# Patient Record
Sex: FEMALE | Race: BLACK OR AFRICAN AMERICAN | Employment: UNEMPLOYED | URBAN - METROPOLITAN AREA
[De-identification: names, ages, dates, MRNs, and addresses within clinical notes are randomized per-mention and may not be internally consistent; named-entity substitution may affect disease eponyms.]

---

## 2021-08-09 ENCOUNTER — OFFICE VISIT (OUTPATIENT)
Dept: ORTHOPEDIC SURGERY | Age: 71
End: 2021-08-09
Payer: COMMERCIAL

## 2021-08-09 VITALS
WEIGHT: 161.8 LBS | BODY MASS INDEX: 30.55 KG/M2 | TEMPERATURE: 97.2 F | HEART RATE: 45 BPM | HEIGHT: 61 IN | OXYGEN SATURATION: 98 %

## 2021-08-09 DIAGNOSIS — M51.27 PROTRUSION OF INTERVERTEBRAL DISC OF LUMBOSACRAL REGION: ICD-10-CM

## 2021-08-09 DIAGNOSIS — M54.31 RIGHT SIDED SCIATICA: Primary | ICD-10-CM

## 2021-08-09 PROCEDURE — 99204 OFFICE O/P NEW MOD 45 MIN: CPT | Performed by: PHYSICAL MEDICINE & REHABILITATION

## 2021-08-09 RX ORDER — CANDESARTAN 32 MG/1
32 TABLET ORAL DAILY
COMMUNITY
Start: 2020-08-24

## 2021-08-09 RX ORDER — LEVOTHYROXINE SODIUM 75 UG/1
TABLET ORAL
COMMUNITY
Start: 2021-02-23

## 2021-08-09 RX ORDER — BIOTIN 1 MG
1 TABLET ORAL
COMMUNITY
Start: 2021-02-22 | End: 2021-11-29

## 2021-08-09 RX ORDER — UBIDECARENONE 50 MG
1 CAPSULE ORAL DAILY
COMMUNITY

## 2021-08-09 RX ORDER — CIDER VINEGAR 300 MG
TABLET ORAL
COMMUNITY
Start: 2021-02-22 | End: 2022-01-06

## 2021-08-09 RX ORDER — HYDROCHLOROTHIAZIDE 25 MG/1
TABLET ORAL
COMMUNITY
Start: 2021-02-22

## 2021-08-09 RX ORDER — PREGABALIN 50 MG/1
CAPSULE ORAL
Qty: 60 CAPSULE | Refills: 1 | Status: SHIPPED | OUTPATIENT
Start: 2021-08-09 | End: 2021-10-29

## 2021-08-09 NOTE — PROGRESS NOTES
[address], [address]  [place], [address] [place] [address] Street  Phone: [phone]  Fax: [phone]  : 1950  PCP: Tristan Eisenberg MD    NEW PATIENT EVALUATION      ASSESSMENT AND PLAN    Diagnoses and all orders for this visit:    1. Right sided sciatica  -     pregabalin (Lyrica) 50 mg capsule; Take 1 po QHS x 1 week. Then increase to BID    2. Protrusion of intervertebral disc of lumbosacral region, R  -     pregabalin (Lyrica) 50 mg capsule; Take 1 po QHS x 1 week. Then increase to BID         1. Shabana Duenas is a 80 y.o. female P/T  w/radiculitis due to R L5/S1 protrusion. Discussed meds vs. inj.  2. Advised to continue HEP as tolerated. 3. Trial of Lyrica 50 mg. Take 1 po QHS x 1 week then increase to BID  4. Avoid repetitive bending, lifting, and twisting. Follow-up and Dispositions    · Return in about 4 weeks (around 2021). HISTORY OF PRESENT ILLNESS  Shabana Duenas is seen today at the request of Dr. Roxy Bustos in consultation for right sciatic pain. She reports the pain being intermittent since 2021. She mentions that the pain is worse with prolonged standing and sitting. The pain does not cause insomnia. Patient used to be able to walk 2 miles, but her walking tolerance has diminished since the onset of pain. She is taking Aleve, which provides temporary benefit. She mentions taking Prednisone with relief. She completed physical therapy x 3with no benefit. Patient is compliant with HEP as tolerated. Denies red flags including persistent fevers, chills, weight changes, neurogenic bowel or bladder symptoms. No prior hx sciatica, no constitutional symptoms.       Pain Assessment  2021   Location of Pain Leg   Location Modifiers Right   Severity of Pain 6   Quality of Pain Dull   Frequency of Pain Constant   Aggravating Factors Standing;Walking   Aggravating Factors Comment lying on right side   Relieving Factors Rest       Onset of pain: intermittent since 4/2021, no injury    Does patient have numbness/tingling in extremities: no    Denies persistent fevers, chills, weight changes, saddle paresthesias, and neurogenic bowel or bladder symptoms. Investigations:     L MRI 7/2021: right L5-S1 disc protrusion, left psoas fatty infiltration  Spine surgery consult: no    Treatments:  Physical therapy: yes - no benefit  Spinal injections: no  Spinal surgery- no  Beneficial medications: Prednisone, Aleve - temporary benefit  Failed medications: none    Work Status:   Pertinent PMHx:  GERD, Hypothyroidism, HTN. PHYSICAL EXAM      Tender right sciatic notch  SLR positive at 90 degrees on right   Radiating posterior thigh pain with FF  LE strength intact    Visit Vitals  Pulse (!) 45   Temp 97.2 °F (36.2 °C) (Skin)   Ht 5' 1\" (1.549 m)   Wt 161 lb 12.8 oz (73.4 kg)   SpO2 98%   BMI 30.57 kg/m²         Past Medical History:   Diagnosis Date    GERD (gastroesophageal reflux disease)     Hypertension     Thyroid disease         Past Surgical History:   Procedure Laterality Date    HX HYSTERECTOMY               Ms. Sanchez Baez may have a reminder for a \"due or due soon\" health maintenance. I have asked that she contact her primary care provider for follow-up on this health maintenance. This note was created using Dragon transcription software and may contain unintended errors.

## 2021-08-09 NOTE — LETTER
8/9/2021    Patient: Zoran Yi   YOB: 1950   Date of Visit: 8/9/2021     Rhonda Collins, [facility]  [address] [address] [address]  Via Fax: [phone]     Christin Hitchcock MD  [address] [facility] [address] [facility] [address] [address]  Via Fax: [phone]    Dear MD Christin Chavez MD,      Thank you for referring Ms. Margarett Duverney to [address] [place] [address] for evaluation. My notes for this consultation are attached. If you have questions, please do not hesitate to call me. I look forward to following your patient along with you.       Sincerely,    Castillo Mejia MD

## 2021-10-19 ENCOUNTER — TELEPHONE (OUTPATIENT)
Dept: ORTHOPEDIC SURGERY | Age: 71
End: 2021-10-19

## 2021-10-19 DIAGNOSIS — M54.31 RIGHT SIDED SCIATICA: Primary | ICD-10-CM

## 2021-10-19 NOTE — TELEPHONE ENCOUNTER
Patients  called on her behalf to request an urgent appointment. He says the last appt was canceled due to an emergency and she cannot wait for next available appointment on 11/24. She prefers to see her physician and doesn't want to wait for cancellations. He says her right sided pain is getting so bad she can hardly walk. Can she be worked in for an emergency appointment? Please contact patient to advise or further discuss, [phone].

## 2021-10-20 ENCOUNTER — OFFICE VISIT (OUTPATIENT)
Dept: ORTHOPEDIC SURGERY | Age: 71
End: 2021-10-20
Payer: COMMERCIAL

## 2021-10-20 VITALS
TEMPERATURE: 96.8 F | OXYGEN SATURATION: 98 % | HEIGHT: 61 IN | WEIGHT: 158 LBS | HEART RATE: 63 BPM | DIASTOLIC BLOOD PRESSURE: 75 MMHG | SYSTOLIC BLOOD PRESSURE: 157 MMHG | BODY MASS INDEX: 29.83 KG/M2

## 2021-10-20 DIAGNOSIS — M54.16 RIGHT LUMBAR RADICULITIS: Primary | ICD-10-CM

## 2021-10-20 DIAGNOSIS — M51.27 PROTRUSION OF INTERVERTEBRAL DISC OF LUMBOSACRAL REGION: ICD-10-CM

## 2021-10-20 PROCEDURE — 20552 NJX 1/MLT TRIGGER POINT 1/2: CPT | Performed by: PHYSICAL MEDICINE & REHABILITATION

## 2021-10-20 PROCEDURE — 99214 OFFICE O/P EST MOD 30 MIN: CPT | Performed by: PHYSICAL MEDICINE & REHABILITATION

## 2021-10-20 RX ORDER — BUPIVACAINE HYDROCHLORIDE 2.5 MG/ML
0.5 INJECTION, SOLUTION INFILTRATION; PERINEURAL ONCE
Status: COMPLETED | OUTPATIENT
Start: 2021-10-20 | End: 2021-10-20

## 2021-10-20 RX ORDER — DULOXETIN HYDROCHLORIDE 30 MG/1
30 CAPSULE, DELAYED RELEASE ORAL
Qty: 30 CAPSULE | Refills: 2 | Status: SHIPPED | OUTPATIENT
Start: 2021-10-20 | End: 2021-11-29

## 2021-10-20 RX ORDER — OMEPRAZOLE 20 MG/1
CAPSULE, DELAYED RELEASE ORAL
COMMUNITY
Start: 2021-08-23

## 2021-10-20 RX ORDER — BETAMETHASONE SODIUM PHOSPHATE AND BETAMETHASONE ACETATE 3; 3 MG/ML; MG/ML
12 INJECTION, SUSPENSION INTRA-ARTICULAR; INTRALESIONAL; INTRAMUSCULAR; SOFT TISSUE ONCE
Status: COMPLETED | OUTPATIENT
Start: 2021-10-20 | End: 2021-10-20

## 2021-10-20 RX ORDER — LIDOCAINE HYDROCHLORIDE 20 MG/ML
0.5 INJECTION, SOLUTION INFILTRATION; PERINEURAL ONCE
Status: COMPLETED | OUTPATIENT
Start: 2021-10-20 | End: 2021-10-20

## 2021-10-20 RX ADMIN — BUPIVACAINE HYDROCHLORIDE 1.25 MG: 2.5 INJECTION, SOLUTION INFILTRATION; PERINEURAL at 12:13

## 2021-10-20 RX ADMIN — BETAMETHASONE SODIUM PHOSPHATE AND BETAMETHASONE ACETATE 12 MG: 3; 3 INJECTION, SUSPENSION INTRA-ARTICULAR; INTRALESIONAL; INTRAMUSCULAR; SOFT TISSUE at 12:13

## 2021-10-20 RX ADMIN — LIDOCAINE HYDROCHLORIDE 10 MG: 20 INJECTION, SOLUTION INFILTRATION; PERINEURAL at 12:14

## 2021-10-20 NOTE — LETTER
10/20/2021    Patient: Joselyn Sandoval   YOB: 1950   Date of Visit: 10/20/2021     Maxi Atkins MD  [address] [facility]  Via Fax: [phone]    Dear Maxi Atkins MD,      Thank you for referring Ms. Vanessa Rushing to [facility] for evaluation. My notes for this consultation are attached. If you have questions, please do not hesitate to call me. I look forward to following your patient along with you.       Sincerely,    Junito Horner MD

## 2021-10-20 NOTE — PROGRESS NOTES
[address]  [address], [address]  [place], [address] [place] [address] Street  Phone: [phone]  Fax: [phone]        Lizzie Ramos  : 1950  PCP: Karine Callejas MD    PROGRESS NOTE      ASSESSMENT AND PLAN    Diagnoses and all orders for this visit:    1. Right lumbar radiculitis  -     DULoxetine (CYMBALTA) 30 mg capsule; Take 1 Capsule by mouth daily (with dinner). -     INJECT TRIGGER POINT, 1 OR 2  -     lidocaine (XYLOCAINE) 20 mg/mL (2 %) injection 10 mg  -     betamethasone (CELESTONE) injection 12 mg  -     bupivacaine HCl (MARCAINE) 0.25 % (2.5 mg/mL) injection 1.25 mg    2. Protrusion of intervertebral disc of lumbosacral region  -     DULoxetine (CYMBALTA) 30 mg capsule; Take 1 Capsule by mouth daily (with dinner). 1. Farshad Dooley is a 80 y.o. female  with waxing and waning right lumbosacral radiculitis. 2. Discussed medication, spinal injection, and surgery as treatment options  3. Trigger point injection right iliolumbar  4. Trial of Cymbalta 30 mg daily with dinner  5. DC Lyrica  6. Nerve root blocks if not improved      Follow-up and Dispositions    · Return in about 4 weeks (around 2021). HISTORY OF PRESENT ILLNESS      Farshad Dooley is a 80 y.o. female presents for follow up of back and leg pain. LV given trial of Lyrica. Pt denies recent ED visits or hospitalizations. She reports that her back pain is progressively worsening x 1 week. She reports that she was actually doing better, walking around. No recent trauma or increased activity. The pain radiates down her right leg, calf, and into the bottom of her foot. She notes increased stiffness. She also mentions a lower walking tolerance. She is taking Lyrica with no benefit. She as difficulty with her HEP due to pain. Denies red flags including persistent fevers, chills, weight changes, neurogenic bowel or bladder symptoms.   No contralateral symptoms. Pain Assessment  10/20/2021   Location of Pain Back;Leg   Location Modifiers Right   Severity of Pain 10   Quality of Pain Aching   Duration of Pain Persistent   Frequency of Pain Constant   Aggravating Factors Walking   Aggravating Factors Comment -   Limiting Behavior Yes   Relieving Factors Nothing     Onset: Spring 2021, atraumatic    Investigations:      L MRI 7/2021: right L5-S1 disc protrusion, left psoas fatty infiltration  Spine surgery consult: no     Treatments:  Physical therapy: yes - no benefit  Spinal injections: no  Spinal surgery- no  Beneficial medications: Prednisone, Aleve - temporary benefit  Failed medications: Lyrica      Work Status:   Pertinent PMHx:  GERD, Hypothyroidism, HTN.        PHYSICAL EXAMINATION    Visit Vitals  BP (!) 157/75 (BP 1 Location: Right upper arm, BP Patient Position: Sitting, BP Cuff Size: Adult) Comment: pt asymptomatic, MD aware, pt did not take bp meds yet   Pulse 63   Temp 96.8 °F (36 °C) (Skin)   Ht 5' 1\" (1.549 m)   Wt 158 lb (71.7 kg)   LMP  (LMP Unknown)   SpO2 98% Comment: RA   BMI 29.85 kg/m²       TTP right L4-5, right sciatic notch, right trochanteric bursa  SLR positive on right 90 degrees  Lower extremity strength intact  No edema        [facility]  OFFICE PROCEDURE PROGRESS NOTE      PROCEDURE: In the office today after informed consent using aseptic technique, the patient was injected with a total of 2 cc of 6 mg/cc Celestone, 0.5 cc each of Lidocaine and Marcaine into her right iliolumbar trigger point. Chart reviewed for the following:   IDr. Mauri, have reviewed the History, Physical and updated the Allergic reactions for [address]. Local measures (ice/heat) and medications have not alleviated the symptoms.      TIME OUT performed immediately prior to start of procedure:   Dr. Mauri BLOCK, have performed the following reviews on [address] prior to the start of the procedure:       Patient denies any recent fevers, chills, antibiotics, recent cortisone injections, or infections. * Patient was identified by name and date of birth   * Agreement on procedure being performed was verified  * Risks and Benefits explained to the patient  * Procedure site verified and marked as necessary  * Patient was positioned for comfort  * Consent was signed and verified     Time: 11:48 AM    Date of procedure: 10/20/2021    Procedure performed by:  Isi Martinez MD    Provider assisted by: None    Patient assisted by: Self    How tolerated by patient: Pt tolerated the procedure well with no complications.              Written by Josefina Yeager, as dictated by Ciaran Jack MD.

## 2021-10-20 NOTE — PROGRESS NOTES
Phill Pittman presents today for   Chief Complaint   Patient presents with    Back Pain    Leg Pain     right       Is someone accompanying this pt? Yes, male    Is the patient using any DME equipment during OV? no      Coordination of Care:  1. Have you been to the ER, urgent care clinic since your last visit? no  Hospitalized since your last visit? no    2. Have you seen or consulted any other health care providers outside of the [address] [place] [address] since your last visit? no Include any pap smears or colon screening.  no

## 2021-10-20 NOTE — PROGRESS NOTES
Verbal order entered per Dr. Raymon Taylor as documented on blue sheet: right iliolumbar trigger point injection- 0.5 ml of marcaine, 0.5 ml of lidocaine and 2 ml of celestone

## 2021-10-20 NOTE — PATIENT INSTRUCTIONS
Learning About Trigger Point Injections  What are trigger point injections? A trigger point is a painful knot in a tight band of muscle. A trigger point often causes pain to be felt in other areas, too. For example, a trigger point in the neck or upper back can cause pain in the head. Trigger point injections are shots of medicine into these knots to help relieve the pain. The medicines are usually local anesthetics like lidocaine. Trigger point injections are often part of plan that includes other treatments, such as muscle stretching and strengthening. How is a trigger point injection done? Your doctor first locates a trigger point by pressing around the painful area. This may cause your muscle to hurt or twitch. This tells the doctor that it's the right spot to do the injection. The area is cleaned. Your doctor then injects the medicine into the trigger point. The doctor may inject the medicine in more than one direction within the trigger point. The doctor may change direction without removing the needle. If you have more than one trigger point in the muscle, your doctor may repeat the process. Your doctor may stretch the area to help the muscle relax. You may be shown how to move and stretch the muscle yourself. How long does a trigger point injection take? The procedure takes about 10 to 30 minutes. How long it takes depends on how many trigger points are treated. But the injection itself takes only a few moments. What can you expect after a trigger point injection? The area may feel a bit numb for a few hours. It may also feel sore. Other problems from trigger point injections are rare. There is a chance of a skin infection at the injection site. And if injections are done in the chest area, there is a small risk of puncturing the outer lining of the lung (pneumothorax). Trigger point injections may reduce some or all of your pain. But the pain can come back after the medicine wears off. If your pain comes back, your doctor may suggest more shots or other treatment for longer-lasting relief. Follow your doctor's instructions carefully. And tell your doctor about any new or unusual symptoms, such as chest pain or shortness of breath. Follow-up care is a key part of your treatment and safety. Be sure to make and go to all appointments, and call your doctor if you are having problems. It's also a good idea to know your test results and keep a list of the medicines you take. Where can you learn more? Go to http://www.gray.com/  Enter V425 in the search box to learn more about \"Learning About Trigger Point Injections. \"  Current as of: July 1, 2021               Content Version: 13.0  © 2006-2021 [employer]wise[employer]. Care instructions adapted under license by [employer] (which disclaims liability or warranty for this information). If you have questions about a medical condition or this instruction, always ask your healthcare professional. [address] any warranty or liability for your use of this information.

## 2021-10-20 NOTE — H&P (VIEW-ONLY)
[address] 
[address], [address] Phone: [phone] Fax: [phone] Adenike Almodovar : 1950 PCP: Vickey Sacks, MD 
 
PROGRESS NOTE ASSESSMENT AND PLAN Diagnoses and all orders for this visit: 1. Right lumbar radiculitis -     DULoxetine (CYMBALTA) 30 mg capsule; Take 1 Capsule by mouth daily (with dinner). -     INJECT TRIGGER POINT, 1 OR 2 
-     lidocaine (XYLOCAINE) 20 mg/mL (2 %) injection 10 mg 
-     betamethasone (CELESTONE) injection 12 mg 
-     bupivacaine HCl (MARCAINE) 0.25 % (2.5 mg/mL) injection 1.25 mg 2. Protrusion of intervertebral disc of lumbosacral region 
-     DULoxetine (CYMBALTA) 30 mg capsule; Take 1 Capsule by mouth daily (with dinner). 1. Ryann Moeller is a 80 y.o. female  with waxing and waning right lumbosacral radiculitis. 2. Discussed medication, spinal injection, and surgery as treatment options 3. Trigger point injection right iliolumbar 4. Trial of Cymbalta 30 mg daily with dinner 5. DC Lyrica 6. Nerve root blocks if not improved Follow-up and Dispositions · Return in about 4 weeks (around 2021). HISTORY OF PRESENT ILLNESS 
 
 
Ryann Moeller is a 80 y.o. female presents for follow up of back and leg pain. LV given trial of Lyrica. Pt denies recent ED visits or hospitalizations. She reports that her back pain is progressively worsening x 1 week. She reports that she was actually doing better, walking around. No recent trauma or increased activity. The pain radiates down her right leg, calf, and into the bottom of her foot. She notes increased stiffness. She also mentions a lower walking tolerance. She is taking Lyrica with no benefit. She as difficulty with her HEP due to pain. Denies red flags including persistent fevers, chills, weight changes, neurogenic bowel or bladder symptoms.   No contralateral symptoms. Pain Assessment  10/20/2021 Location of Pain Back;Leg Location Modifiers Right Severity of Pain 10 Quality of Pain Aching Duration of Pain Persistent Frequency of Pain Constant Aggravating Factors Walking Aggravating Factors Comment - Limiting Behavior Yes Relieving Factors Nothing Onset: Spring 2021, atraumatic Investigations:  
  
L MRI 7/2021: right L5-S1 disc protrusion, left psoas fatty infiltration Spine surgery consult: no 
  
Treatments: 
Physical therapy: yes - no benefit Spinal injections: no 
Spinal surgery- no 
Beneficial medications: Prednisone, Aleve - temporary benefit Failed medications: Lyrica  
  
Work Status:  Pertinent PMHx:  GERD, Hypothyroidism, HTN.  
  
 
PHYSICAL EXAMINATION Visit Vitals BP (!) 157/75 (BP 1 Location: Right upper arm, BP Patient Position: Sitting, BP Cuff Size: Adult) Comment: pt asymptomatic, MD aware, pt did not take bp meds yet Pulse 63 Temp 96.8 °F (36 °C) (Skin) Ht 5' 1\" (1.549 m) Wt 158 lb (71.7 kg) LMP  (LMP Unknown) SpO2 98% Comment: RA  
BMI 29.85 kg/m² TTP right L4-5, right sciatic notch, right trochanteric bursa SLR positive on right 90 degrees Lower extremity strength intact No edema [facility] 
OFFICE PROCEDURE PROGRESS NOTE PROCEDURE: In the office today after informed consent using aseptic technique, the patient was injected with a total of 2 cc of 6 mg/cc Celestone, 0.5 cc each of Lidocaine and Marcaine into her right iliolumbar trigger point. Chart reviewed for the following: 
 IDr. Ciaran, have reviewed the History, Physical and updated the Allergic reactions for [address]. Local measures (ice/heat) and medications have not alleviated the symptoms.   
 
TIME OUT performed immediately prior to start of procedure: 
 Dr. Ciaran BLOCK, have performed the following reviews on [address] prior to the start of the procedure: 
     Patient denies any recent fevers, chills, antibiotics, recent cortisone injections, or infections. * Patient was identified by name and date of birth * Agreement on procedure being performed was verified * Risks and Benefits explained to the patient * Procedure site verified and marked as necessary * Patient was positioned for comfort * Consent was signed and verified Time: 11:48 AM 
 
Date of procedure: 10/20/2021 Procedure performed by:  Kylie Valdez MD 
 
Provider assisted by: None Patient assisted by: Self How tolerated by patient: Pt tolerated the procedure well with no complications.   
 
 
 
 
 
Written by Adeline Berman, as dictated by Anuradha Rhodes MD.

## 2021-10-27 ENCOUNTER — TELEPHONE (OUTPATIENT)
Dept: ORTHOPEDIC SURGERY | Age: 71
End: 2021-10-27

## 2021-10-27 DIAGNOSIS — M51.27 PROTRUSION OF INTERVERTEBRAL DISC OF LUMBOSACRAL REGION: Primary | ICD-10-CM

## 2021-10-27 DIAGNOSIS — M54.16 RIGHT LUMBAR RADICULITIS: ICD-10-CM

## 2021-10-27 DIAGNOSIS — M54.31 RIGHT SIDED SCIATICA: ICD-10-CM

## 2021-10-27 NOTE — TELEPHONE ENCOUNTER
I attempted to contact the pt about her message. She was not able to be reached. A message was left on both numbers asking for a return call to the office regarding her message. The number to the office was provided on both messages. No pt names were used in the messages for HIPAA protection.

## 2021-10-27 NOTE — TELEPHONE ENCOUNTER
Have pt take 2 of the cymbalta 30s daily. I put in the order for the block and sent [facility] a staff message. Please call pt and inform her of this.

## 2021-10-27 NOTE — TELEPHONE ENCOUNTER
Patient's  called to report since patient was here on 10/20/21 that her pain has gotten worse. She has taken the steroid mis and Oxycodone and it does not touch her pain. She did have to go to the ED recently with her back pain as well. Patient does have a follow up scheduled for 11/29/21. They are asking if there is anything else she can try.     Patient [phone]

## 2021-10-28 NOTE — TELEPHONE ENCOUNTER
Patient's (Feng) returned the call to the office upset stating he just need a resolution to help with his wife's pain. Patient provided his cell number to call : [phone] if the previous he does not answer the numbers on file.

## 2021-10-28 NOTE — TELEPHONE ENCOUNTER
I called and spoke to Mr. Llanose Jeri regarding the pt. He was notified that an order for the selective nerve root block has been ordered. They will be contacted by the  to get this set up. He was also informed that the pt can now take 2 of the cymbalta's a day now instead of 1. He verbalized understanding and verbalized the instructions back to me. No further questions at this time. He is aware that it may take til Monday for the  to contact them. This was okay with him.

## 2021-10-29 ENCOUNTER — DOCUMENTATION ONLY (OUTPATIENT)
Dept: ORTHOPEDIC SURGERY | Age: 71
End: 2021-10-29

## 2021-10-29 RX ORDER — GABAPENTIN 300 MG/1
300 CAPSULE ORAL 3 TIMES DAILY
Qty: 90 CAPSULE | Refills: 0 | Status: SHIPPED | OUTPATIENT
Start: 2021-10-29 | End: 2021-11-29 | Stop reason: DRUGHIGH

## 2021-10-29 NOTE — TELEPHONE ENCOUNTER
Spoke to patient and spouse. Sciatic pain, cymbalta 30 bid not helping. Denies fever, falls, weakness, B/B incontinence. +constipation. Discussed plan to start Gabapentin, cautioned about somnolence. Is scheduled for blocks this week. Advise to call me if worsening numbness/weakness.

## 2021-10-29 NOTE — TELEPHONE ENCOUNTER
Called and spoke to patients  and went over all paperwork for the procedure. Patient is scheduled with Dr Megan Canela  On 11/4 as I believe that Dr Richi Marquez is full on 11/2/2021. I will send this to Caleb Ramsey just in case she can add her on for 11/2/2021 with Dr Richi Marquez. I'm also sending this to Nurse as the patients  wants to know what can be done for pain control while they wait for the injection.

## 2021-10-29 NOTE — TELEPHONE ENCOUNTER
Patient's  called again angry, upset, and eventually tearful. He is pleading to get this block scheduled. He reports that he was told to stay by the phone all day yesterday, which contradicts what is relayed in the last message, and I did explain this. He also says he spent this morning on the phone with insurance who says if this is an outpatient procedure no prior authorization is needed. He is requesting a call back as soon as possible.  [phone]

## 2021-10-29 NOTE — PROGRESS NOTES
Patient's  Susan Muller called in again upset and frustrated due to his wife's pain. Please review previous message encounters. Patient stated \"no one is helping his dying wife and he will contact his \". I personally disconnected the call after speaking with the patient and making several attempts to explain to him the process of his wife receiving a block order/injection. Patient DID NOT verbalize any understanding and refused to listen, comprehend, and accept what was being told.

## 2021-11-01 DIAGNOSIS — M54.31 RIGHT SIDED SCIATICA: ICD-10-CM

## 2021-11-01 DIAGNOSIS — M51.27 PROTRUSION OF INTERVERTEBRAL DISC OF LUMBOSACRAL REGION: ICD-10-CM

## 2021-11-01 DIAGNOSIS — M54.16 RIGHT LUMBAR RADICULITIS: ICD-10-CM

## 2021-11-02 ENCOUNTER — HOSPITAL ENCOUNTER (OUTPATIENT)
Age: 71
Setting detail: OUTPATIENT SURGERY
Discharge: HOME | End: 2021-11-02
Attending: PHYSICAL MEDICINE & REHABILITATION | Admitting: PHYSICAL MEDICINE & REHABILITATION
Payer: COMMERCIAL

## 2021-11-02 ENCOUNTER — APPOINTMENT (OUTPATIENT)
Dept: GENERAL RADIOLOGY | Age: 71
End: 2021-11-02
Attending: PHYSICAL MEDICINE & REHABILITATION
Payer: COMMERCIAL

## 2021-11-02 VITALS
DIASTOLIC BLOOD PRESSURE: 72 MMHG | SYSTOLIC BLOOD PRESSURE: 154 MMHG | TEMPERATURE: 98.1 F | RESPIRATION RATE: 16 BRPM | OXYGEN SATURATION: 96 % | HEART RATE: 68 BPM

## 2021-11-02 PROCEDURE — 2709999900 HC NON-CHARGEABLE SUPPLY: Performed by: PHYSICAL MEDICINE & REHABILITATION

## 2021-11-02 PROCEDURE — 64483 NJX AA&/STRD TFRM EPI L/S 1: CPT | Performed by: PHYSICAL MEDICINE & REHABILITATION

## 2021-11-02 PROCEDURE — 74011000250 HC RX REV CODE- 250: Performed by: PHYSICAL MEDICINE & REHABILITATION

## 2021-11-02 PROCEDURE — 76010000009 HC PAIN MGT 0 TO 30 MIN PROC: Performed by: PHYSICAL MEDICINE & REHABILITATION

## 2021-11-02 PROCEDURE — 77030039433 HC TY MYLEOGRAM BD -B: Performed by: PHYSICAL MEDICINE & REHABILITATION

## 2021-11-02 PROCEDURE — 77030003669 HC NDL SPN COOK -B: Performed by: PHYSICAL MEDICINE & REHABILITATION

## 2021-11-02 PROCEDURE — 74011250637 HC RX REV CODE- 250/637: Performed by: PHYSICAL MEDICINE & REHABILITATION

## 2021-11-02 PROCEDURE — 74011000636 HC RX REV CODE- 636: Performed by: PHYSICAL MEDICINE & REHABILITATION

## 2021-11-02 PROCEDURE — 64484 NJX AA&/STRD TFRM EPI L/S EA: CPT | Performed by: PHYSICAL MEDICINE & REHABILITATION

## 2021-11-02 PROCEDURE — 74011250636 HC RX REV CODE- 250/636: Performed by: PHYSICAL MEDICINE & REHABILITATION

## 2021-11-02 PROCEDURE — 77030003672 HC NDL SPN HALY -A: Performed by: PHYSICAL MEDICINE & REHABILITATION

## 2021-11-02 RX ORDER — DIAZEPAM 5 MG/1
5-20 TABLET ORAL ONCE
Status: COMPLETED | OUTPATIENT
Start: 2021-11-02 | End: 2021-11-02

## 2021-11-02 RX ORDER — LIDOCAINE HYDROCHLORIDE 10 MG/ML
INJECTION, SOLUTION EPIDURAL; INFILTRATION; INTRACAUDAL; PERINEURAL AS NEEDED
Status: DISCONTINUED | OUTPATIENT
Start: 2021-11-02 | End: 2021-11-02 | Stop reason: HOSPADM

## 2021-11-02 RX ORDER — DEXAMETHASONE SODIUM PHOSPHATE 100 MG/10ML
INJECTION INTRAMUSCULAR; INTRAVENOUS AS NEEDED
Status: DISCONTINUED | OUTPATIENT
Start: 2021-11-02 | End: 2021-11-02 | Stop reason: HOSPADM

## 2021-11-02 RX ADMIN — DIAZEPAM 5 MG: 5 TABLET ORAL at 11:34

## 2021-11-02 NOTE — INTERVAL H&P NOTE
Update History & Physical 
 
The Patient's History and Physical of October 20, 2021 was reviewed. There was no change. The surgical site was confirmed by the patient and me. Plan:  The risk, benefits, expected outcome, and alternative to the recommended procedure have been discussed with the patient. Patient understands and wants to proceed with the procedure.  
 
Electronically signed by Blanco Nunez MD on 11/2/2021 at 11:38 AM

## 2021-11-02 NOTE — DISCHARGE INSTRUCTIONS
[facility]   (GR[facility])  Dr. Sari Beatty, Dr. Digna Tran, Dr. Clay Shed Spinal Procedure (Block) Instructions    * Do not drive a car, operate heavy machinery or dangerous equipment, or make important decisions for 12-24 hours. * Light activity as tolerated; may rest for the remainder of the day. * Resume pre-block medications including those from your other doctors. * Do not drink alcoholic beverages for 24 hours. Alcohol and the medications you have received may interact and cause an adverse reaction. * You may feel better this evening and worse tomorrow, as the numbing medications wears off and the steroid has yet to begin to work. After 48-72 hrs the steroid should begin to release bringing you relief. If you had a medial branch block, no steroids were used. The medial branch block is a test to see if you are a candidate for radiofrequency ablation (RFA). The anesthetic (numbing medicine)  will wear off by the next day. * You may shower this evening and remove any bandages. * Avoid hot tubs/pools/tub soaks and heating pads for 24 hours. You may use cold packs on the procedure site as tolerated for the first 24 hours. * If a headache develops, drink plenty of fluids and rest.  Take over the counter medications for headache if needed. If the headache continues longer than 24 hours, call MD at the [address] [place] [address] Avenue. [phone]    * Continue taking pain medications as needed. * You may resume your regular diet if tolerated. Otherwise, start with sips of water and advance slowly. * If Diabetic: check your blood sugar three times a day for the next 3 days. If your sugar is greater than 300 call your family doctor. If your sugar is greater than 400, have someone transport you to the nearest Emergency Room. * If you experience any of the following problems, Please Call the [address] [place] [address] Avenue at [phone].         * Excessive pain, swelling, redness or odor at or around the surgical area    * Fever of 101 or higher    * Nausea / Vomiting lasting longer than 4 hours or if unable to take medications. * Severe Headache    * Weakness or numbness in arms or legs that is not      resolving   * Any NEW signs of decreased circulation or nerve impairment in leg: change in color, swelling, persistent numbness, tingling                    * Prolonged increase in pain greater than 4 days      PATIENT INSTRUCTIONS:    After oral sedation, for 12-24 hours or while taking prescription Narcotics:  · Limit your activities  · Do not drive and operate hazardous machinery  · Do not make important personal or business decisions  · Do  not drink alcoholic beverages  · If you have not urinated within 8 hours after discharge, please contact your surgeon on call. *  Please give a list of your current medications to your Primary Care Provider. *  Please update this list whenever your medications are discontinued, doses are      changed, or new medications (including over-the-counter products) are added. *  Please carry medication information at all times in case of emergency situations. These are general instructions for a healthy lifestyle:    No smoking/ No tobacco products/ Avoid exposure to second hand smoke    Surgeon General's Warning:  Quitting smoking now greatly reduces serious risk to your health. Obesity, smoking, and sedentary lifestyle greatly increases your risk for illness    A healthy diet, regular physical exercise & weight monitoring are important for maintaining a healthy lifestyle    You may be retaining fluid if you have a history of heart failure or if you experience any of the following symptoms:  Weight gain of 3 pounds or more overnight or 5 pounds in a week, increased swelling in our hands or feet or shortness of breath while lying flat in bed.   Please call your doctor as soon as you notice any of these symptoms; do not wait until your next office visit. Recognize signs and symptoms of STROKE:    F-face looks uneven    A-arms unable to move or move unevenly    S-speech slurred or non-existent    T-time-call 911 as soon as signs and symptoms begin-DO NOT go       Back to bed or wait to see if you get better-TIME IS BRAIN.

## 2021-11-02 NOTE — PROCEDURES
SELECTIVE NERVE ROOT BLOCK PROCEDURE NOTE      Patient Name: Tennille Carbajal  Date of Procedure: November 2, 2021  Preoperative Diagnosis:  Lumbar radiculopathy [M54.16]  Post Operative Diagnosis:  Lumbar radiculopathy [M54.16]  Location:  [address] [place] [address]    Procedure :    right L5 Selective Nerve Root Block  right S1 Selective Nerve Root Block    Consent:  Informed consent was obtained prior to the procedure. The patient was given the opportunity to ask questions regarding the procedure and its associated risks. In addition to the potential risks associated with the procedure itself, the patient was informed both verbally and in writing of the potential side effects of the use of glucocorticoid. The patient appeared to comprehend the informed consent and desired to have the procedure performed. Procedure: The patient was placed in the prone position on the fluoroscopy table and the back was prepped and draped in the usual sterile manner. The exact spinal level was  identified using fluoroscopy, and Lidocaine 1 % was injected locally, a 22 gauge spinal needle was passed to the transverse process. The depth was noted and the needle redirected to pass inferior and approximately one cm anterior to the transverse process. YES  1 cc of Isovue M-200 was used to verify positioning in the epidural and paravertebral space and outlined the course of the spinal nerve into the epidural space. The same procedure was repeated at each spinal level indicated above. No vascular uptake was identified. A total of 10 mg of preservative free Dexamethasone and 1 cc of Lidocaine/site was slowly injected. The patient tolerated the procedure well. The injection area was cleaned and bandaids applied. Not excessive bleeding was noted. Patient dressed and discharged to home with instructions. Discussion: The patient tolerated the procedure well.  Patient reported chelsy-procedural pain on Visual Analog Scale:  pre-8; post-7. Ambulated w/o assistive device post-procedure.                                               Jono Valentin MD  November 2, 2021

## 2021-11-04 ENCOUNTER — TELEPHONE (OUTPATIENT)
Dept: ORTHOPEDIC SURGERY | Age: 71
End: 2021-11-04
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
## 2021-11-08 ENCOUNTER — TELEPHONE (OUTPATIENT)
Dept: ORTHOPEDIC SURGERY | Age: 71
End: 2021-11-08

## 2021-11-08 NOTE — TELEPHONE ENCOUNTER
Attempted to contact Dr. Yamila Perez. He was not able to be reached. A message was left for him letting him know that Dr. Noemi Chahal will not be in the office this week and will return on 11/15/21. I also let him know that her NP Diann Navarro will be in the office if he would like to speak with her. The number to the office was provided.

## 2021-11-08 NOTE — TELEPHONE ENCOUNTER
Dr. Donavan Erickson from [facility] called in requested to speak with Dr. Jean-Claude Crandall in regards to the patient. He did not provide details in regards to this, and is only asking to discuss the patient's information with the doctor.     He's requesting a call back on his cellluar at [phone]

## 2021-11-09 ENCOUNTER — DOCUMENTATION ONLY (OUTPATIENT)
Dept: ORTHOPEDIC SURGERY | Age: 71
End: 2021-11-09

## 2021-11-09 NOTE — PROGRESS NOTES
Spoke with Dr. Deonna Jennings who states pt had a CT of cervical spine in the ER and it has abnormal lesions which may be metastatic. He is requesting he to be seen earlier than her . 11/29/21 appt. I will try to get her appt moved up.

## 2021-11-09 NOTE — TELEPHONE ENCOUNTER
Dr. Khadijah Biswas contacted the office and asked to speak with [address] [place] [address]. The call was transferred to her.

## 2021-11-10 ENCOUNTER — OFFICE VISIT (OUTPATIENT)
Dept: ORTHOPEDIC SURGERY | Age: 71
End: 2021-11-10
Payer: COMMERCIAL

## 2021-11-10 VITALS
OXYGEN SATURATION: 98 % | HEIGHT: 61 IN | TEMPERATURE: 97.9 F | RESPIRATION RATE: 16 BRPM | WEIGHT: 155 LBS | HEART RATE: 72 BPM | BODY MASS INDEX: 29.27 KG/M2

## 2021-11-10 DIAGNOSIS — R93.89 ABNORMAL CT SCAN: ICD-10-CM

## 2021-11-10 DIAGNOSIS — M51.26 HNP (HERNIATED NUCLEUS PULPOSUS), LUMBAR: ICD-10-CM
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
## 2021-11-10 DIAGNOSIS — M54.16 LUMBAR RADICULOPATHY: Primary | ICD-10-CM

## 2021-11-10 PROCEDURE — 99214 OFFICE O/P EST MOD 30 MIN: CPT | Performed by: PHYSICAL MEDICINE & REHABILITATION

## 2021-11-10 RX ORDER — GABAPENTIN 300 MG/1
CAPSULE ORAL
Qty: 120 CAPSULE | Refills: 1 | Status: SHIPPED | OUTPATIENT
Start: 2021-11-10 | End: 2021-11-29 | Stop reason: DRUGHIGH
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
## 2021-11-10 NOTE — PATIENT INSTRUCTIONS
Low Back Arthritis: Exercises  Introduction  Here are some examples of typical rehabilitation exercises for your condition. Start each exercise slowly. Ease off the exercise if you start to have pain. Your doctor or physical therapist will tell you when you can start these exercises and which ones will work best for you. When you are not being active, find a comfortable position for rest. Some people are comfortable on the floor or a medium-firm bed with a small pillow under their head and another under their knees. Some people prefer to lie on their side with a pillow between their knees. Don't stay in one position for too long. Take short walks (10 to 20 minutes) every 2 to 3 hours. Avoid slopes, hills, and stairs until you feel better. Walk only distances you can manage without pain, especially leg pain. How to do the exercises  Pelvic tilt    1. Lie on your back with your knees bent. 2. \"Brace\" your stomachtighten your muscles by pulling in and imagining your belly button moving toward your spine. 3. Press your lower back into the floor. You should feel your hips and pelvis rock back. 4. Hold for 6 seconds while breathing smoothly. 5. Relax and allow your pelvis and hips to rock forward. 6. Repeat 8 to 12 times. Back stretches    1. Get down on your hands and knees on the floor. 2. Relax your head and allow it to droop. Round your back up toward the ceiling until you feel a nice stretch in your upper, middle, and lower back. Hold this stretch for as long as it feels comfortable, or about 15 to 30 seconds. 3. Return to the starting position with a flat back while you are on your hands and knees. 4. Let your back sway by pressing your stomach toward the floor. Lift your buttocks toward the ceiling. 5. Hold this position for 15 to 30 seconds. 6. Repeat 2 to 4 times. Follow-up care is a key part of your treatment and safety.  Be sure to make and go to all appointments, and call your doctor if you are having problems. It's also a good idea to know your test results and keep a list of the medicines you take. Where can you learn more? Go to http://www.[employer] (MXD[employer]D).com/  Enter T094 in the search box to learn more about \"Low Back Arthritis: Exercises. \"  Current as of: July 1, 2021               Content Version: 13.0  © 2006-2021 [employer]. Care instructions adapted under license by [employer] (which disclaims liability or warranty for this information). If you have questions about a medical condition or this instruction, always ask your healthcare professional. [place] [address] any warranty or liability for your use of this information. Sacroiliac Pain: Exercises  Introduction  Here are some examples of exercises for you to try. The exercises may be suggested for a condition or for rehabilitation. Start each exercise slowly. Ease off the exercises if you start to have pain. You will be told when to start these exercises and which ones will work best for you. How to do the exercises  Knee-to-chest stretch    1. Do not do the knee-to-chest exercise if it causes or increases back or leg pain. 2. Lie on your back with your knees bent and your feet flat on the floor. You can put a small pillow under your head and neck if it is more comfortable. 3. Grasp your hands under one knee and bring the knee to your chest, keeping the other foot flat on the floor. 4. Keep your lower back pressed to the floor. Hold for at least 15 to 30 seconds. 5. Relax and lower the knee to the starting position. Repeat with the other leg. 6. Repeat 2 to 4 times with each leg. 7. To get more stretch, keep your other leg flat on the floor while pulling your knee to your chest.  Bridging    1. Lie on your back with both knees bent. Your knees should be bent about 90 degrees. 2. Tighten your belly muscles by pulling in your belly button toward your spine.  Then push your feet into the floor, squeeze your buttocks, and lift your hips off the floor until your shoulders, hips, and knees are all in a straight line. 3. Hold for about 6 seconds as you continue to breathe normally, and then slowly lower your hips back down to the floor and rest for up to 10 seconds. 4. Repeat 8 to 12 times. Hip extension    1. Get down on your hands and knees on the floor. 2. Keeping your back and neck straight, lift one leg straight out behind you. When you lift your leg, keep your hips level. Don't let your back twist, and don't let your hip drop toward the floor. 3. Hold for 6 seconds. Repeat 8 to 12 times with each leg. 4. If you feel steady and strong when you do this exercise, you can make it more difficult. To do this, when you lift your leg, also lift the opposite arm straight out in front of you. For example, lift the left leg and the right arm at the same time. (This is sometimes called the \"bird dog exercise. \") Hold for 6 seconds, and repeat 8 to 12 times on each side. Clamshell    1. Lie on your side with a pillow under your head. Keep your feet and knees together and your knees bent. 2. Raise your top knee, but keep your feet together. Do not let your hips roll back. Your legs should open up like a clamshell. 3. Hold for 6 seconds. 4. Slowly lower your knee back down. Rest for 10 seconds. 5. Repeat 8 to 12 times. 6. Switch to your other side and repeat steps 1 through 5. Hamstring wall stretch    1. Lie on your back in a doorway, with one leg through the open door. 2. Slide your affected leg up the wall to straighten your knee. You should feel a gentle stretch down the back of your leg. 3. Hold the stretch for at least 1 minute to begin. Then try to lengthen the time you hold the stretch to as long as 6 minutes. 4. Switch legs, and repeat steps 1 through 3.  5. Repeat 2 to 4 times.   6. If you do not have a place to do this exercise in a doorway, there is another way to do it:  7. Lie on your back, and bend one knee. 8. Loop a towel under the ball and toes of that foot, and hold the ends of the towel in your hands. 9. Straighten your knee, and slowly pull back on the towel. You should feel a gentle stretch down the back of your leg. 10. Switch legs, and repeat steps 1 through 3.  11. Repeat 2 to 4 times. 1. Do not arch your back. 2. Do not bend either knee. 3. Keep one heel touching the floor and the other heel touching the wall. Do not point your toes. Lower abdominal strengthening    1. Lie on your back with your knees bent and your feet flat on the floor. 2. Tighten your belly muscles by pulling your belly button in toward your spine. 3. Lift one foot off the floor and bring your knee toward your chest, so that your knee is straight above your hip and your leg is bent like the letter \"L. \"  4. Lift the other knee up to the same position. 5. Lower one leg at a time to the starting position. 6. Keep alternating legs until you have lifted each leg 8 to 12 times. 7. Be sure to keep your belly muscles tight and your back still as you are moving your legs. Be sure to breathe normally. Piriformis stretch    1. Lie on your back with your legs straight. 2. Lift your affected leg, and bend your knee. With your opposite hand, reach across your body, and then gently pull your knee toward your opposite shoulder. 3. Hold the stretch for 15 to 30 seconds. 4. Switch legs and repeat steps 1 through 3.  5. Repeat 2 to 4 times. Follow-up care is a key part of your treatment and safety. Be sure to make and go to all appointments, and call your doctor if you are having problems. It's also a good idea to know your test results and keep a list of the medicines you take. Where can you learn more? Go to http://www.gray.com/  Enter R349 in the search box to learn more about \"Sacroiliac Pain: Exercises. \"  Current as of: July 1, 2021               Content Version: 13.0  © 6492-3880 [employer]. Care instructions adapted under license by [employer] (which disclaims liability or warranty for this information). If you have questions about a medical condition or this instruction, always ask your healthcare professional. [address] any warranty or liability for your use of this information.
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
## 2021-11-15 NOTE — TELEPHONE ENCOUNTER
Late entry from 11/4/21. Reviewed CT. Spoke to pt and spouse. Definitely needs further eval for neck and oncology work-up. Denies neck pain, UE symptoms, fevers, weight loss. They stated they would fu w/PCP, Dr. Aylin Triana.

## 2021-11-16 ENCOUNTER — TELEPHONE (OUTPATIENT)
Dept: ORTHOPEDIC SURGERY | Age: 71
End: 2021-11-16

## 2021-11-16 NOTE — TELEPHONE ENCOUNTER
A Mr Cuca Ceron ( No Hipaa in Epic / CC ) called and said that he was with the patient at her last appt. That the patient saw Dr. Jasbir Frost on 11/14/21 due to Perfecto Jain was not in the office. That the patient is Dr. Shayy Feldman patient. Mr. Antonio Valerio said that the patient Gabapentin medication was upped , but that it is not helping the patient at all. Mr. Antonio Valerio is requesting a call from Dr. Shayy Feldman or a nurse. Mr. Kenrick Robert. [phone]. Patient tel. [phone].

## 2021-11-16 NOTE — TELEPHONE ENCOUNTER
It has only been 6 days since the dose was increased. It can take 4-6 weeks to see the full effect so he needs to give it more time.

## 2021-11-16 NOTE — TELEPHONE ENCOUNTER
I called and spoke to Mr. Barbie Velazquez. The pt was identified using 2 pt identifiers. He was made aware of the provider's response to his recent call and concerns. He verbalized understanding and will have the pt continue to take the medications. He states that the pt had a good night last night and seems to be having a good day today. No questions or concerns voiced at this time.

## 2021-11-29 ENCOUNTER — OFFICE VISIT (OUTPATIENT)
Dept: ORTHOPEDIC SURGERY | Age: 71
End: 2021-11-29
Payer: COMMERCIAL

## 2021-11-29 VITALS
BODY MASS INDEX: 29.45 KG/M2 | HEART RATE: 61 BPM | WEIGHT: 156 LBS | TEMPERATURE: 97.4 F | OXYGEN SATURATION: 100 % | HEIGHT: 61 IN

## 2021-11-29 DIAGNOSIS — M51.26 HNP (HERNIATED NUCLEUS PULPOSUS), LUMBAR: ICD-10-CM

## 2021-11-29 DIAGNOSIS — R93.7 ABNORMAL CT SCAN, CERVICAL SPINE: ICD-10-CM

## 2021-11-29 DIAGNOSIS — M54.16 RIGHT LUMBAR RADICULITIS: Primary | ICD-10-CM

## 2021-11-29 PROCEDURE — 99214 OFFICE O/P EST MOD 30 MIN: CPT | Performed by: PHYSICAL MEDICINE & REHABILITATION

## 2021-11-29 RX ORDER — ACETAMINOPHEN 500 MG
500 TABLET ORAL
COMMUNITY
Start: 2021-11-03

## 2021-11-29 RX ORDER — GABAPENTIN 600 MG/1
600 TABLET ORAL 3 TIMES DAILY
Qty: 270 TABLET | Refills: 0 | Status: SHIPPED | OUTPATIENT
Start: 2021-11-29 | End: 2022-01-06 | Stop reason: SDUPTHER

## 2021-11-29 NOTE — PROGRESS NOTES
Bufford Hamman presents today for   Chief Complaint   Patient presents with    Back Pain       Is someone accompanying this pt? Yes, male    Is the patient using any DME equipment during OV? no    Fall Risk  Fall Risk Assessment, last 12 mths 11/10/2021   Able to walk? Yes   Fall in past 12 months? 0   Do you feel unsteady? 0   Are you worried about falling 0     Coordination of Care:  1. Have you been to the ER, urgent care clinic since your last visit? no  Hospitalized since your last visit? no    2. Have you seen or consulted any other health care providers outside of the [address] [place] [address] since your last visit? Yes, Dr. Naveed Foley (female) Include any pap smears or colon screening.  no
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
## 2021-11-29 NOTE — LETTER
11/29/2021    Patient: Scott Marino   YOB: 1950   Date of Visit: 11/29/2021     Farnaz Judd MD  [address] [facility], [address] [facility]  Via Fax: [phone]    Dear Farnaz Judd MD,      Thank you for referring Ms. Lesli Majano to [address] [place] [address] for evaluation. My notes for this consultation are attached. If you have questions, please do not hesitate to call me. I look forward to following your patient along with you.       Sincerely,    Jack Meyer MD

## 2021-12-01 ENCOUNTER — TELEPHONE (OUTPATIENT)
Dept: ORTHOPEDIC SURGERY | Age: 71
End: 2021-12-01

## 2021-12-01 NOTE — TELEPHONE ENCOUNTER
Patient called she is asking for a note  stating that she has been under your care from 8-9-21 (NPE visit) to now or how ever long you think she might be.   Patient can be reached at [phone]

## 2021-12-01 NOTE — LETTER
12/2/2021 4:30 PM    Ms. Lesa Love  [address] [place] [address]      To whom it may concern: The above-named patient has been under my care since August 19, 2021. She is having ongoing treatment. She has a chronic condition. If you need any further questions or concerns, please do not hesitate to contact the office.         Sincerely,        Kylie Valdez MD

## 2021-12-03 NOTE — TELEPHONE ENCOUNTER
The letter has been completed by the provider and is ready for  at the office. She was not able to be reached. The number provided in the message went to voicemail. Messages cannot be left for the pt because the voicemail has not been set up. I tried calling the mobile number and this also went to voicemail. A message was left for the pt letting her know that the letter requested has been done and is ready for . The number to the office was provided in case there are any questions or concerns.

## 2022-01-06 ENCOUNTER — OFFICE VISIT (OUTPATIENT)
Dept: ORTHOPEDIC SURGERY | Age: 72
End: 2022-01-06
Payer: COMMERCIAL

## 2022-01-06 VITALS
WEIGHT: 159 LBS | BODY MASS INDEX: 30.02 KG/M2 | HEART RATE: 67 BPM | HEIGHT: 61 IN | TEMPERATURE: 97.5 F | OXYGEN SATURATION: 97 %

## 2022-01-06 DIAGNOSIS — C50.919 METASTATIC BREAST CANCER (HCC): ICD-10-CM

## 2022-01-06 DIAGNOSIS — M54.16 RIGHT LUMBAR RADICULITIS: ICD-10-CM

## 2022-01-06 DIAGNOSIS — M51.26 HNP (HERNIATED NUCLEUS PULPOSUS), LUMBAR: Primary | ICD-10-CM

## 2022-01-06 PROCEDURE — 99214 OFFICE O/P EST MOD 30 MIN: CPT | Performed by: PHYSICAL MEDICINE & REHABILITATION

## 2022-01-06 RX ORDER — GABAPENTIN 600 MG/1
600 TABLET ORAL 3 TIMES DAILY
Qty: 270 TABLET | Refills: 1 | Status: SHIPPED | OUTPATIENT
Start: 2022-01-06
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
## 2022-01-06 NOTE — PROGRESS NOTES
Tiago Arredondo presents today for   Chief Complaint   Patient presents with    Leg Pain       Is someone accompanying this pt? yes    Is the patient using any DME equipment during OV? no    Depression Screening:  No flowsheet data found. Learning Assessment:  No flowsheet data found. Abuse Screening:  No flowsheet data found. Fall Risk  Fall Risk Assessment, last 12 mths 11/10/2021   Able to walk? Yes   Fall in past 12 months? 0   Do you feel unsteady? 0   Are you worried about falling 0       OPIOID RISK TOOL  No flowsheet data found. Coordination of Care:  1. Have you been to the ER, urgent care clinic since your last visit? no  Hospitalized since your last visit? no    2. Have you seen or consulted any other health care providers outside of the [address] [place] [address] since your last visit? no Include any pap smears or colon screening.  no

## 2022-01-06 NOTE — LETTER
1/8/2022    Patient: Frieda Rose   YOB: 1950   Date of Visit: 1/6/2022     Lalit Gómez MD  [address] [facility], [address]  Via Fax: [phone]    Dear Lalit Gómez MD,      Thank you for referring Ms. Baljit Gonzalez to [address] [place] [address] for evaluation. My notes for this consultation are attached. If you have questions, please do not hesitate to call me. I look forward to following your patient along with you.       Sincerely,    Lilian Riggs MD

## 2022-02-14 ENCOUNTER — TELEPHONE (OUTPATIENT)
Dept: ORTHOPEDIC SURGERY | Age: 72
End: 2022-02-14

## 2022-02-14 NOTE — TELEPHONE ENCOUNTER
I do not see where Dr. Linda Jaimes took her out of work. She is being treated for metastatic breast cancer. Perhaps her oncologist took her OOW?

## 2022-02-14 NOTE — TELEPHONE ENCOUNTER
Mao Cassy Abisai (NOT LISTED ON HIPAA IN CC) called in stating \"the patient needs a new work note stated her condition and a return to work date\"    Please ADVISE PATIENT at [phone]

## 2022-02-14 NOTE — LETTER
NOTIFICATION RETURN TO WORK / SCHOOL    2/15/2022 11:11 AM    Ms. Kumar Said  [address] [facility] [address]      To Whom It May Concern:    José Ramsay is currently under the care of [address] [facility]. The above-named patient has been under my care since August 19, 2021. She is having ongoing treatment for a spine condition. Her return to work date is uncertain due to current treatment for her cancer. If there are questions or concerns please have the patient contact our office.         Sincerely,        Josefina Lamb MD

## 2022-02-15 NOTE — TELEPHONE ENCOUNTER
Attempted to contact the pt about her request. She was not able to be reached. A message was left asking for a return call to the office at her earliest convenience regarding an earlier conversation. The number to the office was provided.

## 2022-02-15 NOTE — TELEPHONE ENCOUNTER
I called and spoke to Mrs. Watts. The pt was identified using 2 pt identifiers. She was asked if possibly her oncologist put her out of work. I let the pt know that there are no letters in her chart that reference her being taken out of work by Dr. Chetna Logan. The pt is addiment that she did this for her. Pt is aware that the provider is not in the office today and that her message will be sent to her for review. She will be contacted once she has time to review. The pt verbalized understanding and has no questions or concerns at this time.

## 2022-02-15 NOTE — TELEPHONE ENCOUNTER
Looks like I wrote a letter for her 12/2021 just that she was under treatment. I've written another one available in Epic.

## 2022-02-16 NOTE — TELEPHONE ENCOUNTER
I called and spoke to the pt. The pt was identified using 2 pt identifiers. She was made aware of the updated letter and that she can come by the office to pick this up anytime during normal business hours. The pt verbalized understanding and has no questions or concerns at this time.

## 2022-02-23 ENCOUNTER — TRANSCRIBE ORDER (OUTPATIENT)
Dept: SCHEDULING | Age: 72
End: 2022-02-23

## 2022-02-23 DIAGNOSIS — C50.411 MALIGNANT NEOPLASM OF UPPER-OUTER QUADRANT OF RIGHT FEMALE BREAST (HCC): Primary | ICD-10-CM

## 2022-03-14 ENCOUNTER — HOSPITAL ENCOUNTER (OUTPATIENT)
Dept: NUCLEAR MEDICINE | Age: 72
Discharge: HOME | End: 2022-03-14
Attending: INTERNAL MEDICINE
Payer: COMMERCIAL

## 2022-03-14 ENCOUNTER — HOSPITAL ENCOUNTER (OUTPATIENT)
Dept: CT IMAGING | Age: 72
Discharge: HOME | End: 2022-03-14
Attending: INTERNAL MEDICINE
Payer: COMMERCIAL

## 2022-03-14 DIAGNOSIS — C50.411 MALIGNANT NEOPLASM OF UPPER-OUTER QUADRANT OF RIGHT FEMALE BREAST (HCC): ICD-10-CM

## 2022-03-14 LAB — CREAT UR-MCNC: 1 MG/DL (ref 0.6–1.3)

## 2022-03-14 PROCEDURE — 82565 ASSAY OF CREATININE: CPT

## 2022-03-14 PROCEDURE — 74177 CT ABD & PELVIS W/CONTRAST: CPT

## 2022-03-14 PROCEDURE — 78306 BONE IMAGING WHOLE BODY: CPT

## 2022-03-14 PROCEDURE — 74011000636 HC RX REV CODE- 636: Performed by: INTERNAL MEDICINE

## 2022-03-14 RX ADMIN — IOPAMIDOL 100 ML: 612 INJECTION, SOLUTION INTRAVENOUS at 07:37

## 2022-03-19 PROBLEM — M51.27 PROTRUSION OF INTERVERTEBRAL DISC OF LUMBOSACRAL REGION: Status: ACTIVE | Noted: 2021-10-20

## 2022-04-06 ENCOUNTER — OFFICE VISIT (OUTPATIENT)
Dept: ORTHOPEDIC SURGERY | Age: 72
End: 2022-04-06
Payer: COMMERCIAL

## 2022-04-06 VITALS — BODY MASS INDEX: 29.27 KG/M2 | TEMPERATURE: 97.6 F | WEIGHT: 155 LBS | HEIGHT: 61 IN | HEART RATE: 78 BPM

## 2022-04-06 DIAGNOSIS — M51.27 PROTRUSION OF INTERVERTEBRAL DISC OF LUMBOSACRAL REGION: Primary | ICD-10-CM

## 2022-04-06 PROCEDURE — 99213 OFFICE O/P EST LOW 20 MIN: CPT | Performed by: PHYSICAL MEDICINE & REHABILITATION

## 2022-04-06 RX ORDER — DIPHENOXYLATE HYDROCHLORIDE AND ATROPINE SULFATE 2.5; .025 MG/1; MG/1
TABLET ORAL
COMMUNITY
Start: 2022-02-08

## 2022-04-06 RX ORDER — ABEMACICLIB 150 MG/1
TABLET ORAL
COMMUNITY
Start: 2022-03-30

## 2022-04-06 RX ORDER — POTASSIUM CHLORIDE 1500 MG/1
TABLET, FILM COATED, EXTENDED RELEASE ORAL
COMMUNITY
Start: 2022-01-26

## 2022-04-06 NOTE — PROGRESS NOTES
[facility] [address], [address]  [place] [address]  Phone: [phone]  Fax: [phone]        Elpidio Sanchez  : 1950  PCP: Javy Ross MD    PROGRESS NOTE      ASSESSMENT AND PLAN    Diagnoses and all orders for this visit:    1. Protrusion of intervertebral disc of lumbosacral region        1. Michela Hollingsworth is a 80 y.o. female with breast cancer in remission, back pain decreased, occasional sciatic pain. Will trial very slow taper of gabapentin. She is to remain on the lowest effective dose. .   2. Trial Gabapentin wean. Patient will take Gabapentin 600 mg BID x 1 month. If symptoms do not increase, take Gabapentin QHS x 1 month. 3. Future refills of Gabapentin if needed, through PCP if agreeable  4. Advised to continue HEP as tolerated. Follow-up and Dispositions    · Return if symptoms worsen or fail to improve, unless filling Gabapentin. HISTORY OF PRESENT ILLNESS      Michela Hollingsworth is a 80 y.o. female presents for follow up of back pain. She reports that her pain is stable. She has intermittent right sciatica that increases with bending. Her standing and walking tolerance has improved. . Denies numbness or tingling. She is taking Gabapentin 600 mg TID with benefit. Denies side effects. She is compliant with her HEP. Her breast cancer is in remission.     Pain Assessment  2022   Location of Pain Back   Location Modifiers -   Severity of Pain 0   Quality of Pain Aching   Quality of Pain Comment -   Duration of Pain Other (Comment)   Duration of Pain Comment havent had pain in month   Frequency of Pain Other (Comment)   Frequency of Pain Comment havent had pain in month   Date Pain First Started -   Aggravating Factors Bending   Aggravating Factors Comment -   Limiting Behavior No   Relieving Factors Other (Comment)   Relieving Factors Comment medication prescribe   Result of Injury No Investigations:   C CT 11/2021: positive for numerous lucent lesions concerning for metastatic disease/myeloma   L MRI 7/2021: right L5-S1 disc protrusion, left psoas fatty infiltration  Spine surgery consult: no     Treatments:  Physical therapy: yes - no benefit  Spinal injections: Right L5, S1 SNRB 11/2021 with benefit  Spinal surgery- no  Beneficial medications: Gabapentin, prednisone, Aleve - temporary benefit  Failed medications: Lyrica      Work Status:   Pertinent PMHx:  GERD, Hypothyroidism, HTN, metastatic breast cancer diagnosed 2021 (incidental lymphadenopathy on trauma cervical CT).     PHYSICAL EXAMINATION    Visit Vitals  Pulse 78   Temp 97.6 °F (36.4 °C) (Temporal)   Ht 5' 1\" (1.549 m)   Wt 155 lb (70.3 kg)   LMP  (LMP Unknown)   BMI 29.29 kg/m²     Radiating right posterior leg pain with FF  SLR positive on right with DF  LE strength intact  No edema  Intact tandem gait               Written by Mimi Mendes, as dictated by Leslie Feliz MD.

## 2022-04-06 NOTE — PROGRESS NOTES
Andrew Tavera presents today for   Chief Complaint   Patient presents with    Back Pain       Is someone accompanying this pt? yes    Is the patient using any DME equipment during OV? no    Depression Screening:  No flowsheet data found. Learning Assessment:  No flowsheet data found. Abuse Screening:  No flowsheet data found. Fall Risk  Fall Risk Assessment, last 12 mths 11/10/2021   Able to walk? Yes   Fall in past 12 months? 0   Do you feel unsteady? 0   Are you worried about falling 0       OPIOID RISK TOOL  No flowsheet data found. Coordination of Care:  1. Have you been to the ER, urgent care clinic since your last visit? no  Hospitalized since your last visit? no    2. Have you seen or consulted any other health care providers outside of the [address] [place] [address] since your last visit? no Include any pap smears or colon screening.  no

## 2022-04-06 NOTE — LETTER
4/7/2022    Patient: Patt Abdi   YOB: 1950   Date of Visit: 4/6/2022     Мария Kuhn MD  [address] [facility], [address]  Via Fax: [phone]    Dear Мария Kuhn MD,      Thank you for referring Ms. Uri Troy to [address] [place] [address] for evaluation. My notes for this consultation are attached. If you have questions, please do not hesitate to call me. I look forward to following your patient along with you.       Sincerely,    Donald Gardner MD

## 2022-06-23 ENCOUNTER — TRANSCRIBE ORDER (OUTPATIENT)
Dept: SCHEDULING | Age: 72
End: 2022-06-23

## 2022-06-23 DIAGNOSIS — C50.411 MALIGNANT NEOPLASM OF UPPER-OUTER QUADRANT OF RIGHT FEMALE BREAST (HCC): Primary | ICD-10-CM

## 2022-07-22 ENCOUNTER — HOSPITAL ENCOUNTER (OUTPATIENT)
Dept: NUCLEAR MEDICINE | Age: 72
Discharge: HOME | End: 2022-07-22
Attending: INTERNAL MEDICINE
Payer: COMMERCIAL

## 2022-07-22 ENCOUNTER — HOSPITAL ENCOUNTER (OUTPATIENT)
Dept: CT IMAGING | Age: 72
Discharge: HOME | End: 2022-07-22
Attending: INTERNAL MEDICINE
Payer: COMMERCIAL

## 2022-07-22 DIAGNOSIS — C50.411 MALIGNANT NEOPLASM OF UPPER-OUTER QUADRANT OF RIGHT FEMALE BREAST (HCC): ICD-10-CM

## 2022-07-22 LAB — CREAT UR-MCNC: 0.9 MG/DL (ref 0.6–1.3)

## 2022-07-22 PROCEDURE — 74177 CT ABD & PELVIS W/CONTRAST: CPT

## 2022-07-22 PROCEDURE — 78306 BONE IMAGING WHOLE BODY: CPT

## 2022-07-22 PROCEDURE — 82565 ASSAY OF CREATININE: CPT

## 2022-07-22 PROCEDURE — 74011000636 HC RX REV CODE- 636: Performed by: INTERNAL MEDICINE

## 2022-07-22 RX ADMIN — IOPAMIDOL 100 ML: 612 INJECTION, SOLUTION INTRAVENOUS at 08:15

## 2022-10-26 ENCOUNTER — TRANSCRIBE ORDER (OUTPATIENT)
Dept: SCHEDULING | Age: 72
End: 2022-10-26

## 2022-10-26 DIAGNOSIS — C50.411 MALIGNANT NEOPLASM OF UPPER-OUTER QUADRANT OF RIGHT FEMALE BREAST (HCC): Primary | ICD-10-CM

## 2022-11-21 ENCOUNTER — HOSPITAL ENCOUNTER (OUTPATIENT)
Dept: CT IMAGING | Age: 72
Discharge: HOME | End: 2022-11-21
Attending: PHYSICIAN ASSISTANT
Payer: COMMERCIAL

## 2022-11-21 ENCOUNTER — HOSPITAL ENCOUNTER (OUTPATIENT)
Dept: NUCLEAR MEDICINE | Age: 72
Discharge: HOME | End: 2022-11-21
Attending: PHYSICIAN ASSISTANT
Payer: COMMERCIAL

## 2022-11-21 DIAGNOSIS — C50.411 MALIGNANT NEOPLASM OF UPPER-OUTER QUADRANT OF RIGHT FEMALE BREAST (HCC): ICD-10-CM

## 2022-11-21 LAB — CREAT UR-MCNC: 1 MG/DL (ref 0.6–1.3)

## 2022-11-21 PROCEDURE — 82565 ASSAY OF CREATININE: CPT

## 2022-11-21 PROCEDURE — 78306 BONE IMAGING WHOLE BODY: CPT

## 2022-11-21 PROCEDURE — 74177 CT ABD & PELVIS W/CONTRAST: CPT

## 2022-11-21 PROCEDURE — 74011000636 HC RX REV CODE- 636: Performed by: RADIOLOGY

## 2022-11-21 RX ADMIN — IOPAMIDOL 80 ML: 612 INJECTION, SOLUTION INTRAVENOUS at 08:41

## 2022-11-21 RX ADMIN — DIATRIZOATE MEGLUMINE AND DIATRIZOATE SODIUM 30 ML: 660; 100 LIQUID ORAL; RECTAL at 08:41

## 2023-09-20 ENCOUNTER — HOSPITAL ENCOUNTER (OUTPATIENT)
Facility: HOSPITAL | Age: 73
Discharge: HOME | End: 2023-09-23
Attending: INTERNAL MEDICINE
Payer: COMMERCIAL

## 2023-09-20 DIAGNOSIS — C50.411 MALIGNANT NEOPLASM OF UPPER-OUTER QUADRANT OF RIGHT FEMALE BREAST, UNSPECIFIED ESTROGEN RECEPTOR STATUS (HCC): ICD-10-CM

## 2023-09-20 LAB — CREAT UR-MCNC: 1.5 MG/DL (ref 0.6–1.3)

## 2023-09-20 PROCEDURE — 6360000004 HC RX CONTRAST MEDICATION: Performed by: INTERNAL MEDICINE

## 2023-09-20 PROCEDURE — 82565 ASSAY OF CREATININE: CPT

## 2023-09-20 PROCEDURE — 74177 CT ABD & PELVIS W/CONTRAST: CPT

## 2023-09-20 RX ORDER — IODIXANOL 320 MG/ML
100 INJECTION, SOLUTION INTRAVASCULAR
Status: COMPLETED | OUTPATIENT
Start: 2023-09-20 | End: 2023-09-20

## 2023-09-20 RX ADMIN — IODIXANOL 80 ML: 320 INJECTION, SOLUTION INTRAVASCULAR at 09:02

## 2023-09-20 RX ADMIN — DIATRIZOATE MEGLUMINE AND DIATRIZOATE SODIUM 30 ML: 660; 100 LIQUID ORAL; RECTAL at 08:54

## 2023-11-20 ENCOUNTER — HOSPITAL ENCOUNTER (OUTPATIENT)
Facility: HOSPITAL | Age: 73
Discharge: HOME | End: 2023-11-23
Attending: INTERNAL MEDICINE
Payer: COMMERCIAL

## 2023-11-20 DIAGNOSIS — C77.3 SECONDARY MALIGNANT NEOPLASM OF BRACHIAL LYMPH NODE (HCC): ICD-10-CM

## 2023-11-20 DIAGNOSIS — C50.411 MALIGNANT NEOPLASM OF UPPER-OUTER QUADRANT OF RIGHT FEMALE BREAST, UNSPECIFIED ESTROGEN RECEPTOR STATUS (HCC): ICD-10-CM

## 2023-11-20 DIAGNOSIS — C79.52 SECONDARY MALIGNANT NEOPLASM OF BONE AND BONE MARROW (HCC): ICD-10-CM

## 2023-11-20 DIAGNOSIS — C79.51 SECONDARY MALIGNANT NEOPLASM OF BONE AND BONE MARROW (HCC): ICD-10-CM

## 2023-11-20 DIAGNOSIS — C78.00 MALIGNANT NEOPLASM METASTATIC TO LUNG, UNSPECIFIED LATERALITY (HCC): ICD-10-CM

## 2023-11-20 PROCEDURE — 71260 CT THORAX DX C+: CPT

## 2023-11-20 PROCEDURE — 6360000004 HC RX CONTRAST MEDICATION: Performed by: INTERNAL MEDICINE

## 2023-11-20 RX ADMIN — IOPAMIDOL 40 ML: 612 INJECTION, SOLUTION INTRAVENOUS at 09:01

## 2024-03-05 ENCOUNTER — HOSPITAL ENCOUNTER (OUTPATIENT)
Facility: HOSPITAL | Age: 74
Discharge: HOME | End: 2024-03-08
Attending: INTERNAL MEDICINE
Payer: COMMERCIAL

## 2024-03-05 DIAGNOSIS — C77.3 SECONDARY MALIGNANT NEOPLASM OF AXILLARY LYMPH NODES (HCC): ICD-10-CM

## 2024-03-05 DIAGNOSIS — C78.00 MALIGNANT NEOPLASM METASTATIC TO LUNG, UNSPECIFIED LATERALITY (HCC): ICD-10-CM

## 2024-03-05 DIAGNOSIS — C79.51 SECONDARY MALIGNANT NEOPLASM OF BONE (HCC): ICD-10-CM

## 2024-03-05 DIAGNOSIS — C50.411 MALIGNANT NEOPLASM OF UPPER-OUTER QUADRANT OF RIGHT FEMALE BREAST, UNSPECIFIED ESTROGEN RECEPTOR STATUS (HCC): ICD-10-CM

## 2024-03-05 LAB — CREAT UR-MCNC: 1.4 MG/DL (ref 0.6–1.3)

## 2024-03-05 PROCEDURE — 71260 CT THORAX DX C+: CPT

## 2024-03-05 PROCEDURE — 82565 ASSAY OF CREATININE: CPT

## 2024-03-05 PROCEDURE — 6360000004 HC RX CONTRAST MEDICATION: Performed by: INTERNAL MEDICINE

## 2024-03-05 RX ORDER — IODIXANOL 320 MG/ML
100 INJECTION, SOLUTION INTRAVASCULAR
Status: COMPLETED | OUTPATIENT
Start: 2024-03-05 | End: 2024-03-05

## 2024-03-05 RX ADMIN — IODIXANOL 80 ML: 320 INJECTION, SOLUTION INTRAVASCULAR at 10:18

## 2024-03-05 RX ADMIN — DIATRIZOATE MEGLUMINE AND DIATRIZOATE SODIUM 30 ML: 660; 100 LIQUID ORAL; RECTAL at 10:18

## 2024-06-27 ENCOUNTER — HOSPITAL ENCOUNTER (OUTPATIENT)
Facility: HOSPITAL | Age: 74
Discharge: HOME | End: 2024-06-27
Attending: INTERNAL MEDICINE
Payer: COMMERCIAL

## 2024-06-27 DIAGNOSIS — C77.3 SECONDARY MALIGNANT NEOPLASM OF AXILLARY LYMPH NODES (HCC): ICD-10-CM

## 2024-06-27 DIAGNOSIS — C78.00 MALIGNANT NEOPLASM METASTATIC TO LUNG, UNSPECIFIED LATERALITY (HCC): ICD-10-CM

## 2024-06-27 DIAGNOSIS — C79.52 SECONDARY MALIGNANT NEOPLASM OF BONE AND BONE MARROW (HCC): ICD-10-CM

## 2024-06-27 DIAGNOSIS — C79.51 SECONDARY MALIGNANT NEOPLASM OF BONE AND BONE MARROW (HCC): ICD-10-CM

## 2024-06-27 DIAGNOSIS — C50.411 MALIGNANT NEOPLASM OF UPPER-OUTER QUADRANT OF RIGHT FEMALE BREAST, UNSPECIFIED ESTROGEN RECEPTOR STATUS (HCC): ICD-10-CM

## 2024-06-27 PROCEDURE — A9609 HC RX DIAGNOSTIC RADIOPHARMACEUTICAL: HCPCS | Performed by: INTERNAL MEDICINE

## 2024-06-27 PROCEDURE — 78815 PET IMAGE W/CT SKULL-THIGH: CPT

## 2024-06-27 PROCEDURE — 3430000000 HC RX DIAGNOSTIC RADIOPHARMACEUTICAL: Performed by: INTERNAL MEDICINE

## 2024-06-27 RX ORDER — FLUDEOXYGLUCOSE F-18 500 MCI/ML
12.64 INJECTION INTRAVENOUS
Status: COMPLETED | OUTPATIENT
Start: 2024-06-27 | End: 2024-06-27

## 2024-06-27 RX ADMIN — FLUDEOXYGLUCOSE F-18 12.64 MILLICURIE: 500 INJECTION INTRAVENOUS at 09:41

## 2024-10-31 ENCOUNTER — HOSPITAL ENCOUNTER (OUTPATIENT)
Facility: HOSPITAL | Age: 74
Discharge: HOME | End: 2024-11-03
Attending: INTERNAL MEDICINE

## 2024-10-31 DIAGNOSIS — C50.411 MALIGNANT NEOPLASM OF UPPER-OUTER QUADRANT OF RIGHT FEMALE BREAST, UNSPECIFIED ESTROGEN RECEPTOR STATUS (HCC): ICD-10-CM

## 2024-11-01 ENCOUNTER — HOSPITAL ENCOUNTER (OUTPATIENT)
Facility: HOSPITAL | Age: 74
Discharge: HOME | End: 2024-11-04
Attending: INTERNAL MEDICINE
Payer: MEDICARE

## 2024-11-01 PROCEDURE — 78815 PET IMAGE W/CT SKULL-THIGH: CPT

## 2024-11-01 PROCEDURE — A9609 HC RX DIAGNOSTIC RADIOPHARMACEUTICAL: HCPCS | Performed by: INTERNAL MEDICINE

## 2024-11-01 PROCEDURE — 3430000000 HC RX DIAGNOSTIC RADIOPHARMACEUTICAL: Performed by: INTERNAL MEDICINE

## 2024-11-01 RX ORDER — FLUDEOXYGLUCOSE F-18 500 MCI/ML
12 INJECTION INTRAVENOUS
Status: COMPLETED | OUTPATIENT
Start: 2024-11-01 | End: 2024-11-01

## 2024-11-01 RX ADMIN — FLUDEOXYGLUCOSE F-18 12 MILLICURIE: 500 INJECTION INTRAVENOUS at 12:55

## 2024-11-05 ENCOUNTER — TRANSCRIBE ORDERS (OUTPATIENT)
Facility: HOSPITAL | Age: 74
End: 2024-11-05

## 2024-11-05 DIAGNOSIS — C50.411 MALIGNANT NEOPLASM OF UPPER-OUTER QUADRANT OF RIGHT FEMALE BREAST, UNSPECIFIED ESTROGEN RECEPTOR STATUS (HCC): ICD-10-CM

## 2024-11-05 DIAGNOSIS — R60.0 LOCALIZED EDEMA: Primary | ICD-10-CM

## 2024-11-06 ENCOUNTER — HOSPITAL ENCOUNTER (OUTPATIENT)
Facility: HOSPITAL | Age: 74
Discharge: HOME | End: 2024-11-08
Attending: INTERNAL MEDICINE
Payer: COMMERCIAL

## 2024-11-06 DIAGNOSIS — R60.0 LOCALIZED EDEMA: ICD-10-CM

## 2024-11-06 DIAGNOSIS — C50.411 MALIGNANT NEOPLASM OF UPPER-OUTER QUADRANT OF RIGHT FEMALE BREAST, UNSPECIFIED ESTROGEN RECEPTOR STATUS (HCC): ICD-10-CM

## 2024-11-06 PROCEDURE — 93971 EXTREMITY STUDY: CPT

## 2025-01-02 ENCOUNTER — HOSPITAL ENCOUNTER (OUTPATIENT)
Facility: HOSPITAL | Age: 75
Discharge: HOME | End: 2025-01-04
Attending: INTERNAL MEDICINE
Payer: COMMERCIAL

## 2025-01-02 VITALS
BODY MASS INDEX: 29.27 KG/M2 | DIASTOLIC BLOOD PRESSURE: 80 MMHG | WEIGHT: 155 LBS | HEIGHT: 61 IN | SYSTOLIC BLOOD PRESSURE: 146 MMHG

## 2025-01-02 DIAGNOSIS — C50.919 MALIGNANT NEOPLASM OF FEMALE BREAST, UNSPECIFIED ESTROGEN RECEPTOR STATUS, UNSPECIFIED LATERALITY, UNSPECIFIED SITE OF BREAST (HCC): ICD-10-CM

## 2025-01-02 DIAGNOSIS — T45.1X5A ANEMIA DUE TO CHEMOTHERAPY: ICD-10-CM

## 2025-01-02 DIAGNOSIS — R60.1 ANASARCA: ICD-10-CM

## 2025-01-02 DIAGNOSIS — Z79.899 LONG TERM USE OF DRUG: ICD-10-CM

## 2025-01-02 DIAGNOSIS — D64.81 ANEMIA DUE TO CHEMOTHERAPY: ICD-10-CM

## 2025-01-02 LAB
ECHO AO ASC DIAM: 2.7 CM
ECHO AO ASCENDING AORTA INDEX: 1.59 CM/M2
ECHO AO ROOT DIAM: 2.6 CM
ECHO AO ROOT INDEX: 1.53 CM/M2
ECHO AV AREA PEAK VELOCITY: 2.2 CM2
ECHO AV AREA VTI: 2.3 CM2
ECHO AV AREA/BSA PEAK VELOCITY: 1.3 CM2/M2
ECHO AV AREA/BSA VTI: 1.4 CM2/M2
ECHO AV MEAN GRADIENT: 3 MMHG
ECHO AV MEAN VELOCITY: 0.9 M/S
ECHO AV PEAK GRADIENT: 6 MMHG
ECHO AV PEAK VELOCITY: 1.2 M/S
ECHO AV VELOCITY RATIO: 0.67
ECHO AV VTI: 27.7 CM
ECHO BSA: 1.74 M2
ECHO EST RA PRESSURE: 3 MMHG
ECHO LA VOL A-L A2C: 40 ML (ref 22–52)
ECHO LA VOL A-L A4C: 55 ML (ref 22–52)
ECHO LA VOL MOD A2C: 38 ML (ref 22–52)
ECHO LA VOL MOD A4C: 51 ML (ref 22–52)
ECHO LA VOLUME AREA LENGTH: 47 ML
ECHO LA VOLUME INDEX A-L A2C: 24 ML/M2 (ref 16–34)
ECHO LA VOLUME INDEX A-L A4C: 32 ML/M2 (ref 16–34)
ECHO LA VOLUME INDEX AREA LENGTH: 28 ML/M2 (ref 16–34)
ECHO LA VOLUME INDEX MOD A2C: 22 ML/M2 (ref 16–34)
ECHO LA VOLUME INDEX MOD A4C: 30 ML/M2 (ref 16–34)
ECHO LV E' LATERAL VELOCITY: 10 CM/S
ECHO LV E' SEPTAL VELOCITY: 5 CM/S
ECHO LV EDV A2C: 69 ML
ECHO LV EDV A4C: 70 ML
ECHO LV EDV BP: 69 ML (ref 56–104)
ECHO LV EDV INDEX A4C: 41 ML/M2
ECHO LV EDV INDEX BP: 41 ML/M2
ECHO LV EDV NDEX A2C: 41 ML/M2
ECHO LV EF PHYSICIAN: 60 %
ECHO LV EJECTION FRACTION A2C: 56 %
ECHO LV EJECTION FRACTION A4C: 63 %
ECHO LV EJECTION FRACTION BIPLANE: 59 % (ref 55–100)
ECHO LV ESV A2C: 30 ML
ECHO LV ESV A4C: 26 ML
ECHO LV ESV BP: 28 ML (ref 19–49)
ECHO LV ESV INDEX A2C: 18 ML/M2
ECHO LV ESV INDEX A4C: 15 ML/M2
ECHO LV ESV INDEX BP: 16 ML/M2
ECHO LV FRACTIONAL SHORTENING: 29 % (ref 28–44)
ECHO LV GLOBAL LONGITUDINAL STRAIN (GLS): -21.5 %
ECHO LV INTERNAL DIMENSION DIASTOLE INDEX: 3 CM/M2
ECHO LV INTERNAL DIMENSION DIASTOLIC: 5.1 CM (ref 3.9–5.3)
ECHO LV INTERNAL DIMENSION SYSTOLIC INDEX: 2.12 CM/M2
ECHO LV INTERNAL DIMENSION SYSTOLIC: 3.6 CM
ECHO LV IVSD: 0.8 CM (ref 0.6–0.9)
ECHO LV MASS 2D: 129.4 G (ref 67–162)
ECHO LV MASS INDEX 2D: 76.1 G/M2 (ref 43–95)
ECHO LV POSTERIOR WALL DIASTOLIC: 0.7 CM (ref 0.6–0.9)
ECHO LV RELATIVE WALL THICKNESS RATIO: 0.27
ECHO LVOT AREA: 3.1 CM2
ECHO LVOT AV VTI INDEX: 0.69
ECHO LVOT DIAM: 2 CM
ECHO LVOT MEAN GRADIENT: 1 MMHG
ECHO LVOT PEAK GRADIENT: 3 MMHG
ECHO LVOT PEAK VELOCITY: 0.8 M/S
ECHO LVOT STROKE VOLUME INDEX: 35.3 ML/M2
ECHO LVOT SV: 60 ML
ECHO LVOT VTI: 19.1 CM
ECHO MV A VELOCITY: 0.77 M/S
ECHO MV AREA VTI: 2.4 CM2
ECHO MV E DECELERATION TIME (DT): 321.5 MS
ECHO MV E VELOCITY: 0.67 M/S
ECHO MV E/A RATIO: 0.87
ECHO MV E/E' LATERAL: 6.7
ECHO MV E/E' RATIO (AVERAGED): 10.05
ECHO MV E/E' SEPTAL: 13.4
ECHO MV LVOT VTI INDEX: 1.3
ECHO MV MAX VELOCITY: 0.9 M/S
ECHO MV MEAN GRADIENT: 1 MMHG
ECHO MV MEAN VELOCITY: 0.5 M/S
ECHO MV PEAK GRADIENT: 3 MMHG
ECHO MV VTI: 24.8 CM
ECHO PV MAX VELOCITY: 0.8 M/S
ECHO PV PEAK GRADIENT: 3 MMHG
ECHO RA AREA 4C: 10.2 CM2
ECHO RA END SYSTOLIC VOLUME APICAL 4 CHAMBER INDEX BSA: 12 ML/M2
ECHO RA VOLUME AREA LENGTH APICAL 4 CHAMBER: 20 ML
ECHO RA VOLUME: 20 ML
ECHO RIGHT VENTRICULAR SYSTOLIC PRESSURE (RVSP): 27 MMHG
ECHO RV BASAL DIMENSION: 3.1 CM
ECHO RV FREE WALL PEAK S': 12 CM/S
ECHO RV TAPSE: 2 CM (ref 1.7–?)
ECHO RVOT PEAK GRADIENT: 2 MMHG
ECHO RVOT PEAK VELOCITY: 0.7 M/S
ECHO TV REGURGITANT MAX VELOCITY: 2.45 M/S
ECHO TV REGURGITANT PEAK GRADIENT: 24 MMHG

## 2025-01-02 PROCEDURE — 93306 TTE W/DOPPLER COMPLETE: CPT

## 2025-03-20 ENCOUNTER — TRANSCRIBE ORDERS (OUTPATIENT)
Facility: HOSPITAL | Age: 75
End: 2025-03-20

## 2025-03-20 DIAGNOSIS — C50.411 MALIGNANT NEOPLASM OF UPPER-OUTER QUADRANT OF RIGHT FEMALE BREAST, UNSPECIFIED ESTROGEN RECEPTOR STATUS: Primary | ICD-10-CM

## 2025-03-24 ENCOUNTER — HOSPITAL ENCOUNTER (OUTPATIENT)
Facility: HOSPITAL | Age: 75
Discharge: HOME | End: 2025-03-27
Attending: INTERNAL MEDICINE
Payer: COMMERCIAL

## 2025-03-24 DIAGNOSIS — C50.411 MALIGNANT NEOPLASM OF UPPER-OUTER QUADRANT OF RIGHT FEMALE BREAST, UNSPECIFIED ESTROGEN RECEPTOR STATUS: ICD-10-CM

## 2025-03-24 LAB — CREAT UR-MCNC: 1.6 MG/DL (ref 0.6–1.3)

## 2025-03-24 PROCEDURE — 71260 CT THORAX DX C+: CPT

## 2025-03-24 PROCEDURE — 6360000004 HC RX CONTRAST MEDICATION: Performed by: INTERNAL MEDICINE

## 2025-03-24 PROCEDURE — 82565 ASSAY OF CREATININE: CPT

## 2025-03-24 RX ORDER — IODIXANOL 320 MG/ML
80 INJECTION, SOLUTION INTRAVASCULAR
Status: COMPLETED | OUTPATIENT
Start: 2025-03-24 | End: 2025-03-24

## 2025-03-24 RX ORDER — DIATRIZOATE MEGLUMINE AND DIATRIZOATE SODIUM 660; 100 MG/ML; MG/ML
30 SOLUTION ORAL; RECTAL
Status: DISCONTINUED | OUTPATIENT
Start: 2025-03-24 | End: 2025-03-28 | Stop reason: HOSPADM

## 2025-03-24 RX ORDER — IOPAMIDOL 612 MG/ML
100 INJECTION, SOLUTION INTRAVASCULAR
Status: DISCONTINUED | OUTPATIENT
Start: 2025-03-24 | End: 2025-03-24

## 2025-03-24 RX ADMIN — IODIXANOL 80 ML: 320 INJECTION, SOLUTION INTRAVASCULAR at 12:29

## 2025-03-24 RX ADMIN — DIATRIZOATE MEGLUMINE AND DIATRIZOATE SODIUM 30 ML: 660; 100 LIQUID ORAL; RECTAL at 10:42

## 2025-04-23 ENCOUNTER — TRANSCRIBE ORDERS (OUTPATIENT)
Facility: HOSPITAL | Age: 75
End: 2025-04-23

## 2025-04-23 ENCOUNTER — HOSPITAL ENCOUNTER (OUTPATIENT)
Facility: HOSPITAL | Age: 75
Discharge: HOME | End: 2025-04-26
Payer: MEDICARE

## 2025-04-23 DIAGNOSIS — C50.411 MALIGNANT NEOPLASM OF UPPER-OUTER QUADRANT OF RIGHT FEMALE BREAST, UNSPECIFIED ESTROGEN RECEPTOR STATUS (HCC): ICD-10-CM

## 2025-04-23 DIAGNOSIS — C50.411 MALIGNANT NEOPLASM OF UPPER-OUTER QUADRANT OF RIGHT FEMALE BREAST, UNSPECIFIED ESTROGEN RECEPTOR STATUS (HCC): Primary | ICD-10-CM

## 2025-04-23 PROCEDURE — 93005 ELECTROCARDIOGRAM TRACING: CPT

## 2025-04-24 LAB
EKG ATRIAL RATE: 54 BPM
EKG DIAGNOSIS: NORMAL
EKG P AXIS: 53 DEGREES
EKG P-R INTERVAL: 146 MS
EKG Q-T INTERVAL: 428 MS
EKG QRS DURATION: 86 MS
EKG QTC CALCULATION (BAZETT): 405 MS
EKG R AXIS: 12 DEGREES
EKG T AXIS: 63 DEGREES
EKG VENTRICULAR RATE: 54 BPM

## 2025-04-24 PROCEDURE — 93010 ELECTROCARDIOGRAM REPORT: CPT | Performed by: INTERNAL MEDICINE

## 2025-06-05 ENCOUNTER — HOSPITAL ENCOUNTER (OUTPATIENT)
Facility: HOSPITAL | Age: 75
Discharge: HOME | End: 2025-06-08
Payer: MEDICARE

## 2025-06-05 DIAGNOSIS — Z17.0 MALIGNANT NEOPLASM OF UPPER-OUTER QUADRANT OF RIGHT BREAST IN FEMALE, ESTROGEN RECEPTOR POSITIVE (HCC): ICD-10-CM

## 2025-06-05 DIAGNOSIS — C50.411 MALIGNANT NEOPLASM OF UPPER-OUTER QUADRANT OF RIGHT BREAST IN FEMALE, ESTROGEN RECEPTOR POSITIVE (HCC): ICD-10-CM

## 2025-06-05 PROCEDURE — 93005 ELECTROCARDIOGRAM TRACING: CPT

## 2025-06-06 LAB
EKG ATRIAL RATE: 53 BPM
EKG DIAGNOSIS: NORMAL
EKG P AXIS: 60 DEGREES
EKG P-R INTERVAL: 156 MS
EKG Q-T INTERVAL: 412 MS
EKG QRS DURATION: 92 MS
EKG QTC CALCULATION (BAZETT): 386 MS
EKG R AXIS: 11 DEGREES
EKG T AXIS: 88 DEGREES
EKG VENTRICULAR RATE: 53 BPM

## 2025-06-06 PROCEDURE — 93010 ELECTROCARDIOGRAM REPORT: CPT | Performed by: INTERNAL MEDICINE

## 2025-07-23 ENCOUNTER — HOSPITAL ENCOUNTER (OUTPATIENT)
Age: 75
Discharge: HOME | End: 2025-07-26
Payer: COMMERCIAL

## 2025-07-23 DIAGNOSIS — C50.411 MALIGNANT NEOPLASM OF UPPER-OUTER QUADRANT OF RIGHT FEMALE BREAST, UNSPECIFIED ESTROGEN RECEPTOR STATUS (HCC): ICD-10-CM

## 2025-07-23 LAB — CREAT UR-MCNC: 1.8 MG/DL (ref 0.6–1.3)

## 2025-07-23 PROCEDURE — 71260 CT THORAX DX C+: CPT

## 2025-07-23 PROCEDURE — 6360000004 HC RX CONTRAST MEDICATION: Performed by: PHYSICIAN ASSISTANT

## 2025-07-23 PROCEDURE — 82565 ASSAY OF CREATININE: CPT

## 2025-07-23 PROCEDURE — 2500000003 HC RX 250 WO HCPCS: Performed by: PHYSICIAN ASSISTANT

## 2025-07-23 RX ORDER — IOPAMIDOL 612 MG/ML
100 INJECTION, SOLUTION INTRAVASCULAR
Status: DISCONTINUED | OUTPATIENT
Start: 2025-07-23 | End: 2025-07-23

## 2025-07-23 RX ORDER — IODIXANOL 320 MG/ML
100 INJECTION, SOLUTION INTRAVASCULAR
Status: COMPLETED | OUTPATIENT
Start: 2025-07-23 | End: 2025-07-23

## 2025-07-23 RX ADMIN — IODIXANOL 70 ML: 320 INJECTION, SOLUTION INTRAVASCULAR at 08:36

## 2025-07-23 RX ADMIN — BARIUM SULFATE 900 ML: 20 SUSPENSION ORAL at 07:09

## 2025-08-04 ENCOUNTER — HOSPITAL ENCOUNTER (OUTPATIENT)
Facility: HOSPITAL | Age: 75
Discharge: HOME | End: 2025-08-07

## 2025-08-04 ENCOUNTER — TRANSCRIBE ORDERS (OUTPATIENT)
Facility: HOSPITAL | Age: 75
End: 2025-08-04

## 2025-08-04 DIAGNOSIS — C50.411 MALIGNANT NEOPLASM OF UPPER-OUTER QUADRANT OF RIGHT FEMALE BREAST, UNSPECIFIED ESTROGEN RECEPTOR STATUS (HCC): ICD-10-CM

## 2025-08-04 DIAGNOSIS — C50.411 MALIGNANT NEOPLASM OF UPPER-OUTER QUADRANT OF RIGHT FEMALE BREAST, UNSPECIFIED ESTROGEN RECEPTOR STATUS (HCC): Primary | ICD-10-CM

## 2025-08-04 LAB
EKG ATRIAL RATE: 69 BPM
EKG DIAGNOSIS: NORMAL
EKG P AXIS: 28 DEGREES
EKG P-R INTERVAL: 154 MS
EKG Q-T INTERVAL: 362 MS
EKG QRS DURATION: 88 MS
EKG QTC CALCULATION (BAZETT): 387 MS
EKG R AXIS: 31 DEGREES
EKG T AXIS: 72 DEGREES
EKG VENTRICULAR RATE: 69 BPM

## (undated) DEVICE — BANDAGE ADH W0.75XL3IN UNIV WVN FAB NAT GEN USE STRP N ADH

## (undated) DEVICE — SYR 10ML LUER LOK 1/5ML GRAD --

## (undated) DEVICE — AVANOS* SHORT BEVEL NEEDLE: Brand: AVANOS

## (undated) DEVICE — TRAY MYEL SFTY +

## (undated) DEVICE — (D)NDL SPNE 22GX15CM -- DISC BY MFR W/NO SUB

## (undated) DEVICE — MEDIA CONTRAST 10ML 200MG/ML 41%